# Patient Record
Sex: FEMALE | Race: BLACK OR AFRICAN AMERICAN
[De-identification: names, ages, dates, MRNs, and addresses within clinical notes are randomized per-mention and may not be internally consistent; named-entity substitution may affect disease eponyms.]

---

## 2018-07-13 ENCOUNTER — HOSPITAL ENCOUNTER (OUTPATIENT)
Dept: HOSPITAL 53 - M LAB | Age: 28
End: 2018-07-13
Attending: NURSE PRACTITIONER

## 2018-07-13 DIAGNOSIS — Z00.00: Primary | ICD-10-CM

## 2018-07-14 LAB — RUBEOLA IGG ANTIBODY: 156 AU/ML

## 2018-07-19 LAB
TB TEST (QFT) ANTIGEN MINUS NI: 0.24 IU/ML
TB TEST (QFT) ANTIGEN: 0.45 IU/ML
TB TEST (QFT) MITOGEN: 6.74 IU/ML
TB TEST (QFT) NIL: 0.21 IU/ML

## 2019-03-08 NOTE — NUR
L&D TRIAGE NOTE:

S: 27yo  At 24wks presents with cramping and dysuria. +fm, no vaginal 
bleeding, LOF or ctx. No f/c/n/v

O: vss

Gen: well appearing

abd: gravid

Urine: c/w UTI

A/P: 27yo  with UTI

-macrobid 100MF BID

-f/u with primary OB

-PTL precautions

Cora Cartagena MD

## 2019-06-13 NOTE — REPVR
EXAM: 

 US Pregnancy First Trimester, Transabdominal 



EXAM DATE/TIME: 

 6/13/2019 11:07 PM 



CLINICAL HISTORY: 

 28 years old, female; Abnormal findings; Abnormal radiologic study of 

abdomen/pelvis; Pregnant; Additional info: 29yo 38wks pregnant with recent 

fetal demise. Confirm/growth 



TECHNIQUE: 

 Imaging protocol: Real-time transabdominal obstetrical ultrasound of the 

maternal pelvis and a first trimester pregnancy, less than 14 weeks 0 days, 

with image documentation. 



COMPARISON: 

 No relevant prior studies available. 



FINDINGS: 



GESTATION: 

 Gestation: Single intrauterine fetus. Question of edematous fetal abdominal 

wall. 

 Heart rate: No fetal heartbeat is identified. 

 Presentation: Cephalic presentation. 

 Placenta: Anterior placenta. 

 Amniotic fluid: Oligohydramnios with EWA of 1 cm. 



BIOMETRY: 

 Estimated gestational age: The composite gestational age by ultrasound is 34 

weeks 3 days. 

 Estimated fetal weight: The estimated fetal weight is 2470 grams. 

 Biparietal diameter: The BPD measures 8.7 cm suggesting an age of 35 weeks 2 

days. 

 Head circumference: The head circumference measures 30.4 cm suggesting an age 

of 33 weeks 6 days. 

 Abdominal circumference: The abdominal circumference measures 30.2 cm 

suggesting an age of 34 weeks 1 day. 

 Femur length: The femur length measures 6.8 cm suggesting an age of 34 weeks 5 

days. 



MATERNAL: 

 Uterus: Unremarkable. 

 Cervix: The cervix measures 3.0 cm. 



IMPRESSION: 

1. Fetal demise of 34 week 3 day gestation. 

2. Oligohydramnios. 



Electronically signed by: Bruce Gotti On 06/13/2019  23:55:45 PM